# Patient Record
Sex: FEMALE | Race: WHITE
[De-identification: names, ages, dates, MRNs, and addresses within clinical notes are randomized per-mention and may not be internally consistent; named-entity substitution may affect disease eponyms.]

---

## 2020-11-17 ENCOUNTER — HOSPITAL ENCOUNTER (OUTPATIENT)
Dept: HOSPITAL 33 - SDC | Age: 65
Discharge: HOME | End: 2020-11-17
Attending: OPHTHALMOLOGY
Payer: MEDICARE

## 2020-11-17 VITALS — SYSTOLIC BLOOD PRESSURE: 122 MMHG | DIASTOLIC BLOOD PRESSURE: 79 MMHG | OXYGEN SATURATION: 97 % | HEART RATE: 75 BPM

## 2020-11-17 DIAGNOSIS — K21.9: ICD-10-CM

## 2020-11-17 DIAGNOSIS — H25.811: Primary | ICD-10-CM

## 2020-11-17 DIAGNOSIS — Z79.899: ICD-10-CM

## 2020-11-17 DIAGNOSIS — M81.0: ICD-10-CM

## 2020-11-17 DIAGNOSIS — F41.9: ICD-10-CM

## 2020-11-17 RX ADMIN — TETRACAINE HYDROCHLORIDE ONE ML: 5 SOLUTION OPHTHALMIC at 09:28

## 2020-11-17 RX ADMIN — TETRACAINE HYDROCHLORIDE ONE ML: 5 SOLUTION OPHTHALMIC at 08:58

## 2020-11-18 NOTE — OP
DATE/TIME OF OPERATION: 11/17/2020  0955

TIME DICTATED:  1321

PREOPERATIVE DIAGNOSIS:   Senile cataract of right eye. 

POSTOPERATIVE DIAGNOSIS: Senile cataract of right eye. 

SURGEON: Karrie Cooper MD

ASSISTANT: None.

OPERATION: Cataract extraction of right eye with an intraocular lens implant. 

STANDARD __X___ COMPLEX______

ANESTHESIA: MAC.

___X___  Monitored anesthesia care in combination with topical and intra-cameral 
anesthesia (because of the established specific risk of reflux, arrhythmias, or an anxiety 
attack associated with ocular manipulation as well as difficulty of the ophthalmologist to 
manage such potentially catastrophic events while simultaneously attempting to complete 
the surgical procedure, it was deemed necessary for the patient's safety to have an 
anesthesiologist or a nurse anesthetist present during the procedure whenever possible. 
The anesthesiologist or the nurse anesthetist was utilized to monitor and regulate the 
intravenous sedation of the patient, so the patient was cooperative, relaxed, and 
comfortable).

______ Topical anesthesia using Tetracaine eye drops together with intra cameral 
anesthesia using Lidocaine 1% MPF. The nurse was utilized to monitor the patient. 

ANESTHESIA PROVIDER: Jewel Escamilla CRNA.

COMPLICATIONS: None.

BLOOD LOSS: None.

INDICATIONS: The patient is undergoing cataract surgery in the hopes of eliminating the 
visual complaints and difficulty. 

PROCEDURE: After arriving at the facility's outpatient surgery area, an IV was started; 
the patient was given 5 mg of p.o. Versed. (If an anesthesia provider was not monitoring 
the patient) The patient was then given topical anesthetic Tetracaine eye drops. A cotton 
pellet was soaked into a solution of a combination of Zymaxid 0.5%, Lucas-Synephrine 2.5% 
and Ocufen (other drops might have been substituted referenced in the patient's record). 
The pellet was inserted by the RN into the lower conjunctival cul-de-sac with a sterile 
forceps and left for 20 minutes. The pellet was then removed by the RN with a sterile 
forceps before taking the patient to the operating room. The preoperative area nurse 
identified the patient and marked the correct eye to be operated on.

I identified the correct eye to be operated on and marked it appropriately in the 
outpatient surgery area.

The patient was then taken into the operating room. Tetracaine eye drops were installed 
again in the correct eye. The eyelids and the lashes and the lid margins were scrubbed 
with Betadine solution. One drop of the diluted Betadine solution was placed in the 
conjunctival cul-de-sac for 45 seconds and then was irrigated. A drop of Tetracaine Gel 
was placed in the conjunctival cul-de-sac. The patient's forehead was taped to secure it 
during the procedure. The patient was monitored. 

The patient was then draped in the usual way for this procedure. An eye speculum was used 
to separate the eyelids. The eye was then fixated and a temporal 2.5 mm incision was made 
in the clear cornea temporally at the limbus. Through the incision, 0.25 cc of 1% 
non-preserved lidocaine was injected into the anterior chamber for intracameral 
anesthesia. The anterior chamber was then filled with viscoelastic.

_____The pupil was small. I felt that it would be safer to mechanically dilate the 
pupil. A Malyugin ring was used at this point which dilated the pupil. That was removed 
at the end of the procedure prior to aspiration of the viscoelastic from the anterior 
chamber and posterior to the intraocular lens implant.

_____ The cataract had a great amount of cortical changes. That rendered seeing the 
anterior capsule difficult for a safe performance of an anterior capsulotomy. I injected 
an air bubble into the anterior chamber. I then injected 1 ML of vision blue solution into 
the anterior chamber. The vision blue solution was irrigated from the anterior chamber 
after 30 seconds. The anterior capsule was stained which facilitated performing the 
anterior capsulotomy safely. 

After that was completed, a cystotome was introduced into the anterior chamber and a 
round anterior capsulotomy was performed. The capsule was removed by a forceps.

Hydrodissection was next carried utilizing a 25-gauge cannula and balanced salt solution 
to delineate the cortical material from the capsule and the nucleus from the cortical 
material. The nucleus was rotated freely into the capsular bag with no difficulty.

The phaco tip of the [employer] Phaco[employer]ulsifier was introduced into the anterior 
chamber and two grooves were made into the nucleus 90 degrees apart. Using two spatulas 
resulted into the nucleus being fractured into four quadrants. The phaco tip was then used 
to remove each quadrant of the nucleus.

Viscoelastic was used during this process to protect the corneal endothelium. 

Once the entire nucleus was removed, the phaco tip then was removed and the irrigation tip 
was introduced into the eye and the cortex was removed. The posterior capsule was 
polished. 

______ It was noticed that there was a tear into the posterior capsule with few vitreous 
strands into the pupil plan. An anterior vitrectomy was performed.

A 23.00 diopter, [id number], posterior chamber lens implant, was inspected and found to be 
grossly normal. The implant was inserted into the implant injector cartridge; Viscoelastic 
again was introduced into the anterior chamber, which filled the capsular bag. The 
implant injector's cartridge tip was placed at the limbal wound and the posterior chamber 
implant was released into the capsular bag and rotated appropriately. The implant was 
found to be into the capsular bag and it was centered.

_____ 0.2 ml of Tri-Moxi was introduced via 27 gauge cannula into the vitreous cavity 
through the ciliary processes. 

Viscoelastic was aspirated from the anterior chamber and posterior to the intraocular lens 
implant from the capsular bag using the irrigating tip. 

The anterior chamber was irrigated and filled with 5 cc antibiotic solution (500 cc of BSS 
plus 2 ml of Fortaz 100 mg/ml) ( if patient was not allergic to the medication). The lips 
of the corneal incision were hydrated using BSS solution. The anterior chamber was checked 
and found to be water tight. 

___X__ One drop each of antibiotic, steroid and NSAID drops (refer to chart for drops 
used) were placed in the conjunctival cul-de-sac of the operated eye.

Patient tolerated the procedure quite well and left the operating room in satisfactory 
condition. 

DISCHARGE SUMMARY: The patient was released in stable condition. The patient and those 
with the patient were given an instruction sheet as of how to care for the eye after 
surgery as well as counseling on any abnormal laboratory studies by the postoperative RN.

The patient was also given an appointment card for follow-up in the office and is to call 
immediately for any difficulties including but not limited to pain in the eye, decreased 
vision, discharge from the eye, headache and or fever. 

DISCHARGE DIAGNOSIS: Pseudophakia of right eye.

## 2020-12-15 ENCOUNTER — HOSPITAL ENCOUNTER (OUTPATIENT)
Dept: HOSPITAL 33 - SDC | Age: 65
Discharge: HOME | End: 2020-12-15
Attending: OPHTHALMOLOGY
Payer: MEDICARE

## 2020-12-15 VITALS — DIASTOLIC BLOOD PRESSURE: 69 MMHG | HEART RATE: 74 BPM | SYSTOLIC BLOOD PRESSURE: 116 MMHG

## 2020-12-15 VITALS — OXYGEN SATURATION: 99 %

## 2020-12-15 DIAGNOSIS — H25.812: Primary | ICD-10-CM

## 2020-12-15 DIAGNOSIS — Z79.899: ICD-10-CM

## 2024-11-05 ENCOUNTER — HOSPITAL ENCOUNTER (OUTPATIENT)
Dept: HOSPITAL 33 - SDC | Age: 69
Discharge: HOME | End: 2024-11-05
Attending: FAMILY MEDICINE
Payer: MEDICARE

## 2024-11-05 VITALS — SYSTOLIC BLOOD PRESSURE: 116 MMHG | DIASTOLIC BLOOD PRESSURE: 62 MMHG | HEART RATE: 66 BPM

## 2024-11-05 VITALS — TEMPERATURE: 97.7 F | OXYGEN SATURATION: 96 %

## 2024-11-05 VITALS — RESPIRATION RATE: 18 BRPM

## 2024-11-05 DIAGNOSIS — R19.5: ICD-10-CM

## 2024-11-05 DIAGNOSIS — K29.70: ICD-10-CM

## 2024-11-05 DIAGNOSIS — D12.0: ICD-10-CM

## 2024-11-05 DIAGNOSIS — K44.9: Primary | ICD-10-CM

## 2024-11-05 DIAGNOSIS — R10.13: ICD-10-CM

## 2024-11-05 PROCEDURE — 99100 ANES PT EXTEME AGE<1 YR&>70: CPT
